# Patient Record
Sex: FEMALE | Race: WHITE | NOT HISPANIC OR LATINO | Employment: OTHER | URBAN - METROPOLITAN AREA
[De-identification: names, ages, dates, MRNs, and addresses within clinical notes are randomized per-mention and may not be internally consistent; named-entity substitution may affect disease eponyms.]

---

## 2017-03-16 ENCOUNTER — PATIENT MESSAGE (OUTPATIENT)
Dept: NEUROLOGY | Facility: CLINIC | Age: 47
End: 2017-03-16

## 2017-03-17 ENCOUNTER — TELEPHONE (OUTPATIENT)
Dept: NEUROLOGY | Facility: CLINIC | Age: 47
End: 2017-03-17

## 2017-03-17 DIAGNOSIS — G43.019 INTRACTABLE MIGRAINE WITHOUT AURA AND WITHOUT STATUS MIGRAINOSUS: Primary | ICD-10-CM

## 2017-03-17 RX ORDER — ELETRIPTAN HYDROBROMIDE 40 MG/1
40 TABLET, FILM COATED ORAL
Qty: 36 TABLET | OUTPATIENT
Start: 2017-03-17 | End: 2017-03-20 | Stop reason: SDUPTHER

## 2017-03-17 RX ORDER — NARATRIPTAN 1 MG/1
TABLET ORAL
Qty: 8 TABLET | Refills: 5 | Status: SHIPPED | OUTPATIENT
Start: 2017-03-17 | End: 2017-05-02 | Stop reason: ALTCHOICE

## 2017-03-17 NOTE — TELEPHONE ENCOUNTER
----- Message from Alanis Coelho MD sent at 3/17/2017  7:55 AM CDT -----  Please fax Relpax Rx Please put ORDER # on it: [id number]  FAX directly to: [phone], ORDER #: [id number]. Please contact patient to inform her both Rx requests have been sent.

## 2017-03-20 ENCOUNTER — TELEPHONE (OUTPATIENT)
Dept: NEUROLOGY | Facility: CLINIC | Age: 47
End: 2017-03-20

## 2017-03-20 DIAGNOSIS — G43.019 INTRACTABLE MIGRAINE WITHOUT AURA AND WITHOUT STATUS MIGRAINOSUS: ICD-10-CM

## 2017-03-20 RX ORDER — ELETRIPTAN HYDROBROMIDE 40 MG/1
40 TABLET, FILM COATED ORAL
Qty: 36 TABLET | Status: SHIPPED | OUTPATIENT
Start: 2017-03-20 | End: 2018-11-12 | Stop reason: SDUPTHER

## 2017-03-20 NOTE — TELEPHONE ENCOUNTER
Faxed signed Rx along with order #. Confirmation of transmission printed. Message sent through portal advising pt of this.

## 2017-03-20 NOTE — TELEPHONE ENCOUNTER
Rx RELPAX,  40 MG tablet for filled, quantity - 36 pills. and include directions (frequency).  Please put ORDER # on it: [id number]  FAX directly to: [phone], ORDER #: [id number]. Thank you!  Please renew my prescription -  for RELPAX,  40 MG tablet, quantity - 36 pills. and include directions (frequency).  Please put ORDER # on it: [id number]  FAX directly to: [phone], ORDER #: [id number]. Thank you!

## 2017-03-28 RX ORDER — ONDANSETRON 4 MG/1
TABLET, ORALLY DISINTEGRATING ORAL
Qty: 30 TABLET | Refills: 0 | Status: SHIPPED | OUTPATIENT
Start: 2017-03-28 | End: 2022-01-25

## 2017-04-19 ENCOUNTER — PATIENT OUTREACH (OUTPATIENT)
Dept: ADMINISTRATIVE | Facility: HOSPITAL | Age: 47
End: 2017-04-19

## 2017-04-19 RX ORDER — SULFAMETHOXAZOLE AND TRIMETHOPRIM 800; 160 MG/1; MG/1
TABLET ORAL
Refills: 12 | COMMUNITY
Start: 2017-02-06

## 2017-05-02 ENCOUNTER — OFFICE VISIT (OUTPATIENT)
Dept: INTERNAL MEDICINE | Facility: CLINIC | Age: 47
End: 2017-05-02
Attending: INTERNAL MEDICINE
Payer: COMMERCIAL

## 2017-05-02 ENCOUNTER — HOSPITAL ENCOUNTER (OUTPATIENT)
Dept: CARDIOLOGY | Facility: OTHER | Age: 47
Discharge: HOME | End: 2017-05-02
Attending: INTERNAL MEDICINE
Payer: COMMERCIAL

## 2017-05-02 VITALS
BODY MASS INDEX: 25.94 KG/M2 | SYSTOLIC BLOOD PRESSURE: 107 MMHG | OXYGEN SATURATION: 97 % | WEIGHT: 146.38 LBS | DIASTOLIC BLOOD PRESSURE: 70 MMHG | HEART RATE: 73 BPM | HEIGHT: 63 IN

## 2017-05-02 DIAGNOSIS — E03.9 HYPOTHYROIDISM, UNSPECIFIED TYPE: ICD-10-CM

## 2017-05-02 DIAGNOSIS — Z00.00 ANNUAL PHYSICAL EXAM: ICD-10-CM

## 2017-05-02 DIAGNOSIS — E55.9 VITAMIN D DEFICIENCY: ICD-10-CM

## 2017-05-02 DIAGNOSIS — Z01.818 PRE-OP EVALUATION: Primary | ICD-10-CM

## 2017-05-02 DIAGNOSIS — Z01.818 PRE-OP EVALUATION: ICD-10-CM

## 2017-05-02 DIAGNOSIS — R73.01 IFG (IMPAIRED FASTING GLUCOSE): ICD-10-CM

## 2017-05-02 PROCEDURE — 99214 OFFICE O/P EST MOD 30 MIN: CPT | Mod: S$GLB,,, | Performed by: INTERNAL MEDICINE

## 2017-05-02 PROCEDURE — 99999 PR PBB SHADOW E&M-EST. PATIENT-LVL III: CPT | Mod: 25,PBBFAC,, | Performed by: INTERNAL MEDICINE

## 2017-05-02 PROCEDURE — 93005 ELECTROCARDIOGRAM TRACING: CPT

## 2017-05-02 PROCEDURE — 93010 ELECTROCARDIOGRAM REPORT: CPT | Mod: ,,, | Performed by: INTERNAL MEDICINE

## 2017-05-02 RX ORDER — MUPIROCIN 20 MG/G
OINTMENT TOPICAL
Refills: 0 | COMMUNITY
Start: 2017-04-25 | End: 2022-01-24

## 2017-05-02 RX ORDER — HYDROCODONE BITARTRATE AND ACETAMINOPHEN 5; 325 MG/1; MG/1
TABLET ORAL
Refills: 0 | COMMUNITY
Start: 2017-04-25 | End: 2022-01-24

## 2017-05-02 RX ORDER — TRIAMCINOLONE ACETONIDE 1 MG/G
CREAM TOPICAL
Refills: 0 | COMMUNITY
Start: 2017-03-30 | End: 2022-01-24

## 2017-05-02 NOTE — MR AVS SNAPSHOT
[facility]  [address]  [place] [address]  Phone: [phone]  Fax: [phone]                  Leela Hanna   2017 10:20 AM   Office Visit    Description:  Female : 1970   Provider:  Fina Fry MD   Department:  [facility]           Reason for Visit     Pre-op Exam           Diagnoses this Visit        Comments    Pre-op evaluation    -  Primary     Hypothyroidism, unspecified type         IFG (impaired fasting glucose)         Vitamin D deficiency         Annual physical exam                To Do List           Future Appointments        Provider Department Dept Phone    2017 11:15 AM CARDIOLOGY, TESTS [facility] [phone]      Goals (5 Years of Data)     None      Follow-Up and Disposition     Return in about 1 year (around 2018), or if symptoms worsen or fail to improve.      [facility]     [facility] Nurse Care Line -  Assistance  Unless otherwise directed by your provider, please contact [facility], our nurse care line that is available for  assistance.     Registered nurses in the [facility] provide: appointment scheduling, clinical advisement, health education, and other advisory services.  Call: [phone] (toll free)               Medications           Message regarding Medications     Verify the changes and/or additions to your medication regime listed below are the same as discussed with your clinician today.  If any of these changes or additions are incorrect, please notify your healthcare provider.        STOP taking these medications     naratriptan (AMERGE) 1 MG Tab Take as needed for breakthrough migraine.  Can repeat once after 2 hours ian 24 hour period           Verify that the below list of medications is an accurate representation of the medications you are currently taking.  If none reported, the list may be blank. If  "incorrect, please contact your healthcare provider. Carry this list with you in case of emergency.           Current Medications     ARMOUR THYROID 60 mg Tab TK  1 T  1/2  HOUR   BEFORE  BREAKFAST    eletriptan (RELPAX) 40 MG tablet Take 1 tablet (40 mg total) by mouth as needed. may repeat in 2 hours if necessary, may repeat in 2 hours if necessary, but no more than two tablets in 24 hours    hydrocodone-acetaminophen 5-325mg (NORCO) 5-325 mg per tablet TK 1 TO 2 TS PO Q 6 H PRN P    mupirocin (BACTROBAN) 2 % ointment LORENA INTRANASAL PREOP AS DIRECTED    ondansetron (ZOFRAN-ODT) 4 MG TbDL PLACE ONE TABLET BY MOUTH UNDER THE TONGUE EVERY 8 HOURS AS NEEDED FOR NAUSEA    PROGESTERONE MISC 200 mg.    triamcinolone acetonide 0.1% (KENALOG) 0.1 % cream LORENA ONE APPLICATION ON THE SKIN BID           Clinical Reference Information           Your Vitals Were     BP Pulse Height Weight SpO2 BMI    107/70 73 5' 3" (1.6 m) 66.4 kg (146 lb 6.2 oz) 97% 25.93 kg/m2      Blood Pressure          Most Recent Value    BP  107/70      Allergies as of 5/2/2017     Imitrex [Sumatriptan]    Latex, Natural Rubber      Immunizations Administered on Date of Encounter - 5/2/2017     None      Orders Placed During Today's Visit     Future Labs/Procedures Expected by Expires    Hemoglobin A1c  5/2/2017 5/2/2018    Hepatic function panel  5/9/2017 5/2/2018    SCHEDULED EKG 12-LEAD (to Muse)  As directed 5/2/2018      Language Assistance Services     ATTENTION: Language assistance services are available, free of charge. Please call [phone].      ATENCIÓN: Si habla christy, tiene a grey disposición servicios gratuitos de asistencia lingüística. Llame al [phone].     CHÚ Ý: N?u b?n nói Ti?ng Vi?t, có các d?ch v? h? tr? ngôn ng? mi?n phí dành cho b?n. G?i s? [phone].         [facility] complies with applicable Federal civil rights laws and does not discriminate on the basis of race, color, national origin, age, " disability, or sex.

## 2017-05-02 NOTE — PROGRESS NOTES
Subjective:       Patient ID: Leela Hanna is a 46 y.o. female.    Chief Complaint: Pre-op Exam    HPI   Pt here for pre op exam and annual exam.   Seen by breast surgeon to remove axillary breast tissue L > R May 15, 2017 - seen by 2 surgeons in [facility] and was told would have large scars and went back to [place] for another opinion. Is expecting better post surgical outcomes from less scarring with planned elective procedure.     Patient has no personal or family history of hypercoagulable disorders, pulmonary embolisms, DVT, or bleeding disorders. No family history of sudden death. No personal or family history of difficulty with anesthesia. Patient is able to climb a flight of stairs without difficulty breathing. No cp. Exercising twice per week - free wts and elliptical and rowing, walking on treadmill. Able to clean home and yard work without issues, decreased stamina, sob or cp.     Will have gen anesthesia.   To have bmp, hgc, cbc, inr, ptt, pt - to have labs sung as ordered by surgeon.   Taking armour thyroid prescribed by another MD - to have thyroid labs monitored as well as vit d managed by that MD  F/u with Dr. Díaz gyn in [place]      Review of Systems    Objective:      Physical Exam   Constitutional: She is oriented to person, place, and time. She appears well-developed and well-nourished.   HENT:   Head: Normocephalic and atraumatic.   Right Ear: External ear normal.   Left Ear: External ear normal.   Nose: Nose normal.   Mouth/Throat: Oropharynx is clear and moist. No oropharyngeal exudate.   No carotid bruits   Eyes: Conjunctivae and EOM are normal.   Neck: Neck supple. No thyromegaly present.   Cardiovascular: Normal rate, regular rhythm, normal heart sounds and intact distal pulses.    Pulmonary/Chest: Effort normal and breath sounds normal.   Abdominal: Soft. Bowel sounds are normal.   Musculoskeletal: She exhibits no edema or tenderness.   Lymphadenopathy:     She has no cervical  "adenopathy.   Neurological: She is alert and oriented to person, place, and time. Coordination normal.   Skin: Skin is warm and dry.   Psychiatric: She has a normal mood and affect. Her behavior is normal. Judgment and thought content normal.       Assessment:       Leela was seen today for pre-op exam.    Diagnoses and all orders for this visit:    Pre-op evaluation: Patient has no personal or family history of hypercoagulable disorders, pulmonary embolisms, DVT, or bleeding disorders. No family history of sudden death. No personal or family history of difficulty with anesthesia. Patient is able to climb a flight of stairs without difficulty breathing.  Able to perform > 4 METS. EKG checked and wnl. Will review labs when received. Pt is low risk for procedure - f/u labs when returns  -     Hepatic function panel; Future  -     SCHEDULED EKG 12-LEAD (to Muse); Future    Hypothyroidism, unspecified type: stable - per pt tx with nanette thyroid by another MD "wholistic MD" who is closely monitoring thyroid labs - she reports is due for labs today or sung and will send those results for review as well. kalli current med - was started on this as her thyroid labs were in normal range but close to cut off     IFG (impaired fasting glucose): cont reg exercise and dietary modification  -     Hemoglobin A1c; Future    Vitamin D deficiency: cont vit d supp- per pt other MD is closely monitoring this as well.     Annual physical exam: review labs as ordered by surgeon and other MD as above  Recommend daily sunscreen, cardiovascular exercise min 30 min 5 days per week. Seatbelts routinely.    HM: mmg ordered by pt's gyn per her - she would like to cont this    ADDENDUM: EKG wnl, labs received from [facility]. Form completed and to be faxed by office to surgeon. Pt to be notified ok to proceed with surgery from my standpoint. Allen CELESTE NSQIP risk 0.0%  Maximally medically managed. She is to f/u with her endocrinologist " outside of [facility] to f/u on recent thyroid labs and mgmt of armour thyroid rx

## 2017-05-08 LAB
ALBUMIN SERPL-MCNC: 4.1 G/DL (ref 3.6–5.1)
ALBUMIN/GLOB SERPL: 1.8 (CALC) (ref 1–2.5)
ALP SERPL-CCNC: 89 U/L (ref 33–115)
ALT SERPL-CCNC: 24 U/L (ref 6–29)
AST SERPL-CCNC: 16 U/L (ref 10–35)
BILIRUB DIRECT SERPL-MCNC: 0 MG/DL
BILIRUB INDIRECT SERPL-MCNC: 0.3 MG/DL (CALC) (ref 0.2–1.2)
BILIRUB SERPL-MCNC: 0.3 MG/DL (ref 0.2–1.2)
GLOBULIN SER CALC-MCNC: 2.3 G/DL (CALC) (ref 1.9–3.7)
HBA1C MFR BLD: 5.2 % OF TOTAL HGB
PROT SERPL-MCNC: 6.4 G/DL (ref 6.1–8.1)

## 2017-05-10 ENCOUNTER — TELEPHONE (OUTPATIENT)
Dept: INTERNAL MEDICINE | Facility: CLINIC | Age: 47
End: 2017-05-10

## 2017-05-10 ENCOUNTER — PATIENT MESSAGE (OUTPATIENT)
Dept: INTERNAL MEDICINE | Facility: CLINIC | Age: 47
End: 2017-05-10

## 2017-05-10 NOTE — TELEPHONE ENCOUNTER
Message sent to pt via my chart with lab results and updates to plan.     I have not received the rest of the pre-op labs ordered by her surgeon to date. Can you inquire where they are drawn get those results or ask the lab to send them over ASA for pre op clearance. Thanks!

## 2017-05-10 NOTE — TELEPHONE ENCOUNTER
Attempted to contact [facility] to inquire of pt's lab results for pre op clearance to the second time. Left a message on [facility] on [facility] to return call to office in regards to the pt's labs.

## 2017-05-10 NOTE — TELEPHONE ENCOUNTER
Attempted to contact [facility] to inquire about why the rest of the labs ordered by surgeon to date have not been received. [facility]'s answering service is currently not accepting calls at this time. Will attempt again at a later time.

## 2017-05-10 NOTE — TELEPHONE ENCOUNTER
Pt reports that [facility] faxed results today while she was waiting on phone - please check faxes and print labs and place on my desk to review. Thanks!

## 2017-05-11 NOTE — TELEPHONE ENCOUNTER
----- Message from Carol Espinal sent at 5/10/2017  4:21 PM CDT -----  Contact: Dr. Márquez   x_  1st Request  _  2nd Request  _  3rd Request        Who: Magdalena     Why: Magdalena call to request the history and physical to be faxed to [phone] on tomorrow the surgery is scheduled for Monday 05/15. If any questions call her at the number below.    What Number to Call Back: [phone]    When to Expect a call back: (Before the end of the day)   -- if the call is after 12:00, the call back will be tomorrow.

## 2017-05-12 ENCOUNTER — TELEPHONE (OUTPATIENT)
Dept: INTERNAL MEDICINE | Facility: CLINIC | Age: 47
End: 2017-05-12

## 2017-05-12 ENCOUNTER — PATIENT MESSAGE (OUTPATIENT)
Dept: INTERNAL MEDICINE | Facility: CLINIC | Age: 47
End: 2017-05-12

## 2017-05-12 NOTE — TELEPHONE ENCOUNTER
----- Message from Alfreda Morales sent at 5/11/2017  8:53 AM CDT -----  Contact: Self  x  1st Request  _  2nd Request  _  3rd Request        Who: EVANGELISTA VAN [[id number]]    Why: Pt states she needs to speak to the clinical team regarding her lab work that [facility] is sending over. Pt states she is having surgery soon. Please call, thanks!    What Number to Call Back: [phone]    When to Expect a call back: (Before the end of the day)   -- if the call is after 12:00, the call back will be tomorrow.

## 2017-05-12 NOTE — TELEPHONE ENCOUNTER
Pt's pre op clearance has been faxed as requested by pcp.Left a message on pt's voicemail informing her of pre op clearance.

## 2017-05-12 NOTE — TELEPHONE ENCOUNTER
Please fax completed pre op clearance form to surgeon today and notify pt that ok to proceed with surgery. Thanks!

## 2018-02-20 ENCOUNTER — TELEPHONE (OUTPATIENT)
Dept: INTERNAL MEDICINE | Facility: CLINIC | Age: 48
End: 2018-02-20

## 2018-02-20 NOTE — TELEPHONE ENCOUNTER
----- Message from Mellisa Nugent sent at 2/20/2018 11:53 AM CST -----  Contact: Self [phone]  Pt is requesting a call back to see if she can get a general idea of the cost to see the provider due to her high insurance deductible plan.  Pt attempted to get an answer from billing and pre-service but they were not able to give her an amount.    Pt was scheduled for 3pm today for a cough but has cancelled it and she wanted to know how the appointment would be coded.    Pt may be reached at [phone].    Thank you.  JAVIER

## 2018-02-20 NOTE — TELEPHONE ENCOUNTER
Spoke with Pt. On the phone in regards to her Co pay. Pt. Stated that she would like to know not what the Co pay was but instead about her high insurance deductible. I then informed Pt. That I could provide her with the registration number because they could be more of an assistance to her in that area. Pt declined because she stated that she had already spoken to 10+ people who had told her the same thing. I informed Pt that she could call her insurance company or I can provide her with the contact information for billing and coding. Pt then began to use profanity towards me and asked for the number to give a complaint. I then gave her Patient relations contact information. Pt then hung up.    -June

## 2018-04-27 ENCOUNTER — TELEPHONE (OUTPATIENT)
Dept: ADMINISTRATIVE | Facility: HOSPITAL | Age: 48
End: 2018-04-27

## 2018-04-27 NOTE — TELEPHONE ENCOUNTER
Consultation notes from the [facility] encounter 4.16.18 received and scanned into patient chart can be reviewed under media tab.

## 2018-11-12 ENCOUNTER — TELEPHONE (OUTPATIENT)
Dept: NEUROLOGY | Facility: CLINIC | Age: 48
End: 2018-11-12

## 2018-11-12 DIAGNOSIS — G43.019 INTRACTABLE MIGRAINE WITHOUT AURA AND WITHOUT STATUS MIGRAINOSUS: ICD-10-CM

## 2018-11-12 RX ORDER — ELETRIPTAN HYDROBROMIDE 40 MG/1
40 TABLET, FILM COATED ORAL
Qty: 36 TABLET | OUTPATIENT
Start: 2018-11-12 | End: 2018-11-13 | Stop reason: SDUPTHER

## 2018-11-12 NOTE — TELEPHONE ENCOUNTER
Leela Hanna would like a refill of the following medications:         eletriptan (RELPAX) 40 MG tablet [Alanis Coelho MD]       Patient Comment: Dr Coelho,  Please refill the same as previous - I am able to order 36 tablets at at time.  Thanks.     Preferred pharmacy: Other - [facility] [place] [phone]   Delivery method: Pickup

## 2018-11-12 NOTE — TELEPHONE ENCOUNTER
Leela Hanna would like a refill of the following medications:         eletriptan (RELPAX) 40 MG tablet [Alanis Coelho MD]       Patient Comment: Dr Coelho,  Please refill the same as previous - I am able to order 36 tablets at at time.  Thanks.     Preferred pharmacy: Other - [facility]X TO [facility] [place] [phone]   Delivery method: Pickup

## 2018-11-13 ENCOUNTER — TELEPHONE (OUTPATIENT)
Dept: NEUROLOGY | Facility: CLINIC | Age: 48
End: 2018-11-13

## 2018-11-13 DIAGNOSIS — G43.019 INTRACTABLE MIGRAINE WITHOUT AURA AND WITHOUT STATUS MIGRAINOSUS: ICD-10-CM

## 2018-11-13 RX ORDER — ELETRIPTAN HYDROBROMIDE 40 MG/1
40 TABLET, FILM COATED ORAL
Qty: 36 TABLET | Status: SHIPPED | OUTPATIENT
Start: 2018-11-13 | End: 2022-01-24

## 2018-11-13 NOTE — TELEPHONE ENCOUNTER
Left message with patient and explained dr. Coelho is currently out of the office and is unable to sign prescriptions and I also let her know that she can call her pcp to see if they can fill it for her

## 2018-11-13 NOTE — TELEPHONE ENCOUNTER
Dr. Carbone,     Patient would like her medication to be fax to [facility]. Can you print the medication out for me so I can fax it?    Thanks,   Brandi

## 2018-11-13 NOTE — TELEPHONE ENCOUNTER
----- Message from Zachary Paiz sent at 11/13/2018  9:47 AM CST -----  Contact: EVANGELISTA VAN [[id number]]    Name of Who is Calling: EVANGELISTA VAN [[id number]]      What is the request in detail: Patient needs the prescription for Relpax FAX TO [facility] [phone].      Can the clinic reply by [facility]: no      [facility]: [phone]

## 2018-12-28 DIAGNOSIS — Z12.11 COLON CANCER SCREENING: ICD-10-CM

## 2021-03-04 ENCOUNTER — PATIENT MESSAGE (OUTPATIENT)
Dept: INTERNAL MEDICINE | Facility: CLINIC | Age: 51
End: 2021-03-04

## 2021-04-26 ENCOUNTER — PATIENT MESSAGE (OUTPATIENT)
Dept: RESEARCH | Facility: HOSPITAL | Age: 51
End: 2021-04-26

## 2022-01-07 ENCOUNTER — PATIENT MESSAGE (OUTPATIENT)
Dept: INTERNAL MEDICINE | Facility: CLINIC | Age: 52
End: 2022-01-07
Payer: COMMERCIAL

## 2022-01-10 NOTE — TELEPHONE ENCOUNTER
1. Needs to schedule appt in person to re-est care as last appt was in 2017  2. Ok to schedule VV if sooner availability to review her labs and address her concerns

## 2022-01-24 ENCOUNTER — PATIENT MESSAGE (OUTPATIENT)
Dept: INTERNAL MEDICINE | Facility: CLINIC | Age: 52
End: 2022-01-24
Payer: COMMERCIAL

## 2022-01-25 ENCOUNTER — OFFICE VISIT (OUTPATIENT)
Dept: INTERNAL MEDICINE | Facility: CLINIC | Age: 52
End: 2022-01-25
Attending: INTERNAL MEDICINE
Payer: COMMERCIAL

## 2022-01-25 ENCOUNTER — PATIENT MESSAGE (OUTPATIENT)
Dept: INTERNAL MEDICINE | Facility: CLINIC | Age: 52
End: 2022-01-25

## 2022-01-25 DIAGNOSIS — E78.5 HYPERLIPIDEMIA, UNSPECIFIED HYPERLIPIDEMIA TYPE: Primary | ICD-10-CM

## 2022-01-25 DIAGNOSIS — E55.9 VITAMIN D DEFICIENCY: ICD-10-CM

## 2022-01-25 DIAGNOSIS — E04.1 THYROID NODULE: ICD-10-CM

## 2022-01-25 DIAGNOSIS — G43.909 MIGRAINE WITHOUT STATUS MIGRAINOSUS, NOT INTRACTABLE, UNSPECIFIED MIGRAINE TYPE: ICD-10-CM

## 2022-01-25 DIAGNOSIS — E53.8 VITAMIN B 12 DEFICIENCY: ICD-10-CM

## 2022-01-25 PROCEDURE — 1159F PR MEDICATION LIST DOCUMENTED IN MEDICAL RECORD: ICD-10-PCS | Mod: CPTII,95,, | Performed by: INTERNAL MEDICINE

## 2022-01-25 PROCEDURE — 99204 OFFICE O/P NEW MOD 45 MIN: CPT | Mod: 95,,, | Performed by: INTERNAL MEDICINE

## 2022-01-25 PROCEDURE — 1159F MED LIST DOCD IN RCRD: CPT | Mod: CPTII,95,, | Performed by: INTERNAL MEDICINE

## 2022-01-25 PROCEDURE — 1160F PR REVIEW ALL MEDS BY PRESCRIBER/CLIN PHARMACIST DOCUMENTED: ICD-10-PCS | Mod: CPTII,95,, | Performed by: INTERNAL MEDICINE

## 2022-01-25 PROCEDURE — 1160F RVW MEDS BY RX/DR IN RCRD: CPT | Mod: CPTII,95,, | Performed by: INTERNAL MEDICINE

## 2022-01-25 PROCEDURE — 99204 PR OFFICE/OUTPT VISIT, NEW, LEVL IV, 45-59 MIN: ICD-10-PCS | Mod: 95,,, | Performed by: INTERNAL MEDICINE

## 2022-01-25 RX ORDER — ESCITALOPRAM OXALATE 10 MG/1
10 TABLET ORAL DAILY
COMMUNITY
Start: 2021-12-22

## 2022-01-25 RX ORDER — GALCANEZUMAB 120 MG/ML
INJECTION, SOLUTION SUBCUTANEOUS
COMMUNITY
Start: 2021-01-01

## 2022-01-25 NOTE — PROGRESS NOTES
Subjective:   Patient ID: Leela Hanna is a 51 y.o. female  Chief complaint:   Chief Complaint   Patient presents with    Abnormal Lab     F/u       HPI  The patient location is: [place]  The chief complaint leading to consultation is: lab follow up     Visit type: audiovisual    Face to Face time with patient: 45 min  50 minutes of total time spent on the encounter, which includes face to face time and non-face to face time preparing to see the patient (eg, review of tests), Obtaining and/or reviewing separately obtained history, Documenting clinical information in the electronic or other health record, Independently interpreting results (not separately reported) and communicating results to the patient/family/caregiver, or Care coordination (not separately reported).     Each patient to whom he or she provides medical services by telemedicine is:  (1) informed of the relationship between the physician and patient and the respective role of any other health care provider with respect to management of the patient; and (2) notified that he or she may decline to receive medical services by telemedicine and may withdraw from such care at any time.    Notes:     Here for follow up and lab review - [facility] 5/2017  - seen by wellness doctor: Dr. Carol Rowell - helping to manage armour thyroid   Dr. Perez - derm   - has been followed by her gyn for HRT   - followed by neuro for migraines    She is currently taking:   Escitalopram - 10 milligrams - - through my neurologist  Baclofen - 10 mg (assist in tension headache prevention - through my neurologist  Emgality - monthly injection (migraine prevention - - through my neurologist)  Thyroid [place] - 1 gram  Hydrochlorothazide - 12.5 mg - water retention   Progesterone - 200 mg   Testosterone - ? (don't have it in front of me)  Biest - 5 mg  Vitamin D supplement - with K1, 2 - [employer]  A multi-vitamin    Over past year exercised minimally since  pandemic   Gained weight   Tried keto - eating more beef and cheese   - LDL inc significantly   Using online health  recently - reducing meat significantly and inc vegetables and legumes  - currently Intermittent fasting   - if consumes fats will eat small amt of avocado and flax seed, manuela, vegetables, whole grains, lentils  - increasing exercise recently   - weight 160#     HLD:   LDL inc 195 <- 138  She had carotid US and per her it showed atherosclerosis     Hypothyroidism:   Followed by gyn as above   kalli fitzpatrick     Dad passed away about 2 years ago - cause unknown - home alone and he was found the next morning- hit head after fall - occurred in [place]      Reviewed labs available   She reports carotid US showed thyroid nodule     Review of Systems    Objective:  Vitals:    01/25/22 0649   BP: Comment: doesn't check bp at home     There is no height or weight on file to calculate BMI.    Physical Exam  Constitutional:       General: She is not in acute distress.     Appearance: She is well-developed and well-nourished. She is not diaphoretic.   Eyes:      General:         Right eye: No discharge.         Left eye: No discharge.      Extraocular Movements: EOM normal.      Conjunctiva/sclera: Conjunctivae normal.   Pulmonary:      Effort: Pulmonary effort is normal. No respiratory distress.   Skin:     Findings: No erythema or rash.   Neurological:      Mental Status: She is alert and oriented to person, place, and time.   Psychiatric:         Mood and Affect: Mood and affect normal.         Behavior: Behavior normal.         Thought Content: Thought content normal.         Judgment: Judgment normal.       Assessment:  1. Hyperlipidemia, unspecified hyperlipidemia type    2. Thyroid nodule    3. Migraine without status migrainosus, not intractable, unspecified migraine type    4. Vitamin D deficiency    5. Vitamin B 12 deficiency        Plan:  Leela was seen today for abnormal lab.    Diagnoses and all  orders for this visit:    Hyperlipidemia, unspecified hyperlipidemia type  I recommend the Mediterranean diet, a graded exercise program and maintaining a healthy weight to optimize cholesterol levels.  Discussed med options and she    She will let me know about starting statin - discussed options  Cont graded exercise program     Thyroid nodule  -     US Soft Tissue Head Neck Thyroid; Future  tsh at goal on recent labs - taking armour   Check US to better characterize nodule seen on carotid US     Migraine without status migrainosus, not intractable, unspecified migraine type  Stable followed by neuro   Cont meds     Vitamin D deficiency  Cont suppl   Level improved     Vitamin B 12 deficiency  Stable - cont suppl     she will send us location where thyroid US order should be sent - nodule seen on carotid US from outside facility   All questions were answered and pt verbalized understanding of plan.     rtc in 3 months for in person visit as she is concerned about exposure to Covid during recent surge     Health Maintenance   Topic Date Due    Hepatitis C Screening  Never done    TETANUS VACCINE  12/01/2019    Mammogram  03/12/2022    Lipid Panel  12/31/2025

## 2022-01-28 ENCOUNTER — PATIENT MESSAGE (OUTPATIENT)
Dept: INTERNAL MEDICINE | Facility: CLINIC | Age: 52
End: 2022-01-28
Payer: COMMERCIAL

## 2022-01-31 NOTE — TELEPHONE ENCOUNTER
1. I recommend a statin due to the presence of cholesterol plaque seen on the ultrasound report and the elevated LDL levels - however if she would like to wait and review the results with the ordering physician first then it is ok to wait for that appointment prior to starting any medication to ensure all of her questions are addressed.     2. It is ok to hold off on a cardiology consult for now and instead follow up with the ordering physician first

## 2022-02-01 ENCOUNTER — PATIENT MESSAGE (OUTPATIENT)
Dept: INTERNAL MEDICINE | Facility: CLINIC | Age: 52
End: 2022-02-01
Payer: COMMERCIAL

## 2022-02-01 NOTE — TELEPHONE ENCOUNTER
This was billed as a new patient visit (a patient is considered a new patient if not seen within 3 years in primary care - last clinic visit was in 2017) - for a 45min visit which was the length of the visit   Please offer her the number to our billing department if she would like to investigate this further

## 2022-02-03 NOTE — TELEPHONE ENCOUNTER
[id number] - this was not an annual or preventative/wellness visit as those must be conducted in person in order to be billed as such

## 2022-03-11 ENCOUNTER — PATIENT MESSAGE (OUTPATIENT)
Dept: INTERNAL MEDICINE | Facility: CLINIC | Age: 52
End: 2022-03-11
Payer: COMMERCIAL

## 2022-03-11 DIAGNOSIS — R92.30 BREAST DENSITY: ICD-10-CM

## 2022-03-11 DIAGNOSIS — Z12.31 ENCOUNTER FOR SCREENING MAMMOGRAM FOR BREAST CANCER: Primary | ICD-10-CM

## 2022-03-16 DIAGNOSIS — Z12.11 COLON CANCER SCREENING: ICD-10-CM

## 2022-03-21 ENCOUNTER — PATIENT MESSAGE (OUTPATIENT)
Dept: ADMINISTRATIVE | Facility: HOSPITAL | Age: 52
End: 2022-03-21
Payer: COMMERCIAL

## 2022-05-30 ENCOUNTER — PATIENT MESSAGE (OUTPATIENT)
Dept: ADMINISTRATIVE | Facility: HOSPITAL | Age: 52
End: 2022-05-30
Payer: COMMERCIAL

## 2023-04-12 DIAGNOSIS — Z12.31 OTHER SCREENING MAMMOGRAM: ICD-10-CM

## 2023-10-24 ENCOUNTER — PATIENT MESSAGE (OUTPATIENT)
Dept: INTERNAL MEDICINE | Facility: CLINIC | Age: 53
End: 2023-10-24
Payer: COMMERCIAL

## 2023-10-24 DIAGNOSIS — Z12.31 ENCOUNTER FOR SCREENING MAMMOGRAM FOR BREAST CANCER: Primary | ICD-10-CM

## 2023-10-24 DIAGNOSIS — R92.30 BREAST DENSITY: ICD-10-CM

## 2023-12-13 ENCOUNTER — OFFICE VISIT (OUTPATIENT)
Dept: INTERNAL MEDICINE | Facility: CLINIC | Age: 53
End: 2023-12-13
Attending: INTERNAL MEDICINE
Payer: COMMERCIAL

## 2023-12-13 VITALS
WEIGHT: 157.88 LBS | HEIGHT: 64 IN | SYSTOLIC BLOOD PRESSURE: 104 MMHG | OXYGEN SATURATION: 99 % | HEART RATE: 81 BPM | DIASTOLIC BLOOD PRESSURE: 68 MMHG | BODY MASS INDEX: 26.95 KG/M2

## 2023-12-13 DIAGNOSIS — E03.9 HYPOTHYROIDISM, UNSPECIFIED TYPE: ICD-10-CM

## 2023-12-13 DIAGNOSIS — E53.8 VITAMIN B 12 DEFICIENCY: ICD-10-CM

## 2023-12-13 DIAGNOSIS — Z91.89 AT HIGH RISK FOR BREAST CANCER: ICD-10-CM

## 2023-12-13 DIAGNOSIS — E55.9 VITAMIN D DEFICIENCY: ICD-10-CM

## 2023-12-13 DIAGNOSIS — Z12.11 SCREEN FOR COLON CANCER: ICD-10-CM

## 2023-12-13 DIAGNOSIS — E53.8 B12 DEFICIENCY: ICD-10-CM

## 2023-12-13 DIAGNOSIS — E78.5 HYPERLIPIDEMIA, UNSPECIFIED HYPERLIPIDEMIA TYPE: ICD-10-CM

## 2023-12-13 DIAGNOSIS — Z00.00 ANNUAL PHYSICAL EXAM: Primary | ICD-10-CM

## 2023-12-13 DIAGNOSIS — E04.1 THYROID NODULE: ICD-10-CM

## 2023-12-13 DIAGNOSIS — G43.009 MIGRAINE WITHOUT AURA AND WITHOUT STATUS MIGRAINOSUS, NOT INTRACTABLE: ICD-10-CM

## 2023-12-13 PROCEDURE — 3008F PR BODY MASS INDEX (BMI) DOCUMENTED: ICD-10-PCS | Mod: CPTII,S$GLB,, | Performed by: INTERNAL MEDICINE

## 2023-12-13 PROCEDURE — 3074F SYST BP LT 130 MM HG: CPT | Mod: CPTII,S$GLB,, | Performed by: INTERNAL MEDICINE

## 2023-12-13 PROCEDURE — 1159F MED LIST DOCD IN RCRD: CPT | Mod: CPTII,S$GLB,, | Performed by: INTERNAL MEDICINE

## 2023-12-13 PROCEDURE — 3074F PR MOST RECENT SYSTOLIC BLOOD PRESSURE < 130 MM HG: ICD-10-PCS | Mod: CPTII,S$GLB,, | Performed by: INTERNAL MEDICINE

## 2023-12-13 PROCEDURE — 3078F PR MOST RECENT DIASTOLIC BLOOD PRESSURE < 80 MM HG: ICD-10-PCS | Mod: CPTII,S$GLB,, | Performed by: INTERNAL MEDICINE

## 2023-12-13 PROCEDURE — 1160F RVW MEDS BY RX/DR IN RCRD: CPT | Mod: CPTII,S$GLB,, | Performed by: INTERNAL MEDICINE

## 2023-12-13 PROCEDURE — 99999 PR PBB SHADOW E&M-EST. PATIENT-LVL IV: CPT | Mod: PBBFAC,,, | Performed by: INTERNAL MEDICINE

## 2023-12-13 PROCEDURE — 1159F PR MEDICATION LIST DOCUMENTED IN MEDICAL RECORD: ICD-10-PCS | Mod: CPTII,S$GLB,, | Performed by: INTERNAL MEDICINE

## 2023-12-13 PROCEDURE — 99396 PR PREVENTIVE VISIT,EST,40-64: ICD-10-PCS | Mod: S$GLB,,, | Performed by: INTERNAL MEDICINE

## 2023-12-13 PROCEDURE — 99396 PREV VISIT EST AGE 40-64: CPT | Mod: S$GLB,,, | Performed by: INTERNAL MEDICINE

## 2023-12-13 PROCEDURE — 3078F DIAST BP <80 MM HG: CPT | Mod: CPTII,S$GLB,, | Performed by: INTERNAL MEDICINE

## 2023-12-13 PROCEDURE — 1160F PR REVIEW ALL MEDS BY PRESCRIBER/CLIN PHARMACIST DOCUMENTED: ICD-10-PCS | Mod: CPTII,S$GLB,, | Performed by: INTERNAL MEDICINE

## 2023-12-13 PROCEDURE — 3008F BODY MASS INDEX DOCD: CPT | Mod: CPTII,S$GLB,, | Performed by: INTERNAL MEDICINE

## 2023-12-13 PROCEDURE — 99999 PR PBB SHADOW E&M-EST. PATIENT-LVL IV: ICD-10-PCS | Mod: PBBFAC,,, | Performed by: INTERNAL MEDICINE

## 2023-12-13 RX ORDER — ROSUVASTATIN CALCIUM 5 MG/1
5 TABLET, COATED ORAL DAILY
Qty: 90 TABLET | Refills: 3
Start: 2023-12-13 | End: 2024-01-16 | Stop reason: SDUPTHER
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
## 2023-12-14 ENCOUNTER — CLINICAL SUPPORT (OUTPATIENT)
Dept: ENDOSCOPY | Facility: HOSPITAL | Age: 53
End: 2023-12-14
Attending: INTERNAL MEDICINE
Payer: COMMERCIAL

## 2023-12-14 ENCOUNTER — TELEPHONE (OUTPATIENT)
Dept: ENDOSCOPY | Facility: HOSPITAL | Age: 53
End: 2023-12-14

## 2023-12-14 DIAGNOSIS — Z12.11 SCREEN FOR COLON CANCER: ICD-10-CM

## 2023-12-14 NOTE — PLAN OF CARE
Contacted patient to schedule a Colonoscopy and unable to schedule due to no availability at this time. Patient requested a call back. New PAT appt made and Dept number provided to pt [phone].

## 2023-12-14 NOTE — TELEPHONE ENCOUNTER
Contacted patient to schedule a Colonoscopy and unable to schedule due to no availability at this time. Patient requested a call back. New PAT appt made and Dept number provided to pt [phone].

## 2023-12-18 DIAGNOSIS — Z91.89 AT HIGH RISK FOR BREAST CANCER: ICD-10-CM

## 2023-12-18 DIAGNOSIS — Z12.31 ENCOUNTER FOR SCREENING MAMMOGRAM FOR MALIGNANT NEOPLASM OF BREAST: Primary | ICD-10-CM

## 2023-12-19 ENCOUNTER — TELEPHONE (OUTPATIENT)
Dept: INTERNAL MEDICINE | Facility: CLINIC | Age: 53
End: 2023-12-19
Payer: COMMERCIAL

## 2023-12-19 NOTE — TELEPHONE ENCOUNTER
----- Message from Fina Fry MD sent at 12/18/2023  2:17 PM CST -----  Message sent to pt via my chart with results and updates to plan.     Please arrange breast mri in 6 months - please help schedule  Screening mammogram due in 12 months

## 2023-12-26 PROBLEM — E78.5 HYPERLIPIDEMIA: Status: ACTIVE | Noted: 2023-12-26

## 2023-12-26 PROBLEM — Z91.89 AT HIGH RISK FOR BREAST CANCER: Status: ACTIVE | Noted: 2023-12-26

## 2023-12-27 ENCOUNTER — TELEPHONE (OUTPATIENT)
Dept: ENDOSCOPY | Facility: HOSPITAL | Age: 53
End: 2023-12-27

## 2023-12-27 ENCOUNTER — CLINICAL SUPPORT (OUTPATIENT)
Dept: ENDOSCOPY | Facility: HOSPITAL | Age: 53
End: 2023-12-27
Attending: INTERNAL MEDICINE
Payer: COMMERCIAL

## 2023-12-27 DIAGNOSIS — Z12.11 SCREEN FOR COLON CANCER: ICD-10-CM

## 2023-12-27 RX ORDER — SODIUM, POTASSIUM,MAG SULFATES 17.5-3.13G
1 SOLUTION, RECONSTITUTED, ORAL ORAL DAILY
Qty: 1 KIT | Refills: 0 | Status: SHIPPED | OUTPATIENT
Start: 2023-12-27 | End: 2023-12-29

## 2024-01-15 ENCOUNTER — PATIENT MESSAGE (OUTPATIENT)
Dept: INTERNAL MEDICINE | Facility: CLINIC | Age: 54
End: 2024-01-15
Payer: COMMERCIAL

## 2024-01-15 DIAGNOSIS — E78.5 HYPERLIPIDEMIA, UNSPECIFIED HYPERLIPIDEMIA TYPE: ICD-10-CM

## 2024-01-16 RX ORDER — ROSUVASTATIN CALCIUM 5 MG/1
5 TABLET, COATED ORAL DAILY
Qty: 90 TABLET | Refills: 3 | Status: SHIPPED | OUTPATIENT
Start: 2024-01-16

## 2024-01-16 NOTE — TELEPHONE ENCOUNTER
Care Due:                  Date            Visit Type   Department     Provider  --------------------------------------------------------------------------------                                MYCHART                              ANNUAL                              CHECKUP/PHY  [facility] INTERNAL  Last Visit: 12-      [facility]       Fina Fry  Next Visit: None Scheduled  None         None Found                                                            Last  Test          Frequency    Reason                     Performed    Due Date  --------------------------------------------------------------------------------    CMP.........  12 months..  rosuvastatin.............  Not Found    Overdue    Lipid Panel.  12 months..  rosuvastatin.............  Not Found    Overdue    [employer] Embedded Care Due Messages. Reference number: [id number].   1/16/2024 7:10:17 AM CST

## 2024-03-13 ENCOUNTER — PATIENT MESSAGE (OUTPATIENT)
Dept: INTERNAL MEDICINE | Facility: CLINIC | Age: 54
End: 2024-03-13
Payer: COMMERCIAL

## 2024-03-13 NOTE — TELEPHONE ENCOUNTER
I am not sure what is included in a cardio IQ panel - she should check with her ordered provider or quest as it may included the fasting lipid panel (total cholesterol, triglycerides, HDL and LDL levels).   - only other overlap is CMP, CBC and B12, and vitamin D3

## 2024-03-27 ENCOUNTER — TELEPHONE (OUTPATIENT)
Dept: PHARMACY | Facility: CLINIC | Age: 54
End: 2024-03-27
Payer: COMMERCIAL

## 2024-04-09 ENCOUNTER — TELEPHONE (OUTPATIENT)
Dept: ENDOSCOPY | Facility: HOSPITAL | Age: 54
End: 2024-04-09
Payer: COMMERCIAL

## 2024-04-11 ENCOUNTER — TELEPHONE (OUTPATIENT)
Dept: ENDOSCOPY | Facility: HOSPITAL | Age: 54
End: 2024-04-11
Payer: COMMERCIAL

## 2024-04-11 NOTE — TELEPHONE ENCOUNTER
Returned patient call that was left on voicemail no answer left voicemail asking patient to call us back @ [phone]  
normal...

## 2024-04-12 ENCOUNTER — TELEPHONE (OUTPATIENT)
Dept: ENDOSCOPY | Facility: HOSPITAL | Age: 54
End: 2024-04-12
Payer: COMMERCIAL

## 2024-04-12 NOTE — TELEPHONE ENCOUNTER
Spoke to pt to reschedule procedure(s) Colonoscopy       Physician to perform procedure(s) Dr. KANNAN Westfall  Date of Procedure (s) 7/8/24  Arrival Time 9:30 AM  Time of Procedure(s) 10:30 AM   Location of Procedure(s) [place] 4th Floor  Type of Rx Prep sent to patient: Suprep  Instructions provided to patient via [facility]    Patient was informed on the following information and verbalized understanding. Screening questionnaire reviewed with patient and complete. If procedure requires anesthesia, a responsible adult needs to be present to accompany the patient home, patient cannot drive after receiving anesthesia. Appointment details are tentative, especially check-in time. Patient will receive a prep-op call 7 days prior to confirm check-in time for procedure. If applicable the patient should contact their pharmacy to verify Rx for procedure prep is ready for pick-up. Patient was advised to call the scheduling department at [phone] if pharmacy states no Rx is available. Patient was advised to call the endoscopy scheduling department if any questions or concerns arise.      [facility]oscopy Scheduling Department

## 2024-06-27 ENCOUNTER — TELEPHONE (OUTPATIENT)
Dept: ENDOSCOPY | Facility: HOSPITAL | Age: 54
End: 2024-06-27
Payer: COMMERCIAL

## 2024-06-27 ENCOUNTER — PATIENT MESSAGE (OUTPATIENT)
Dept: ENDOSCOPY | Facility: HOSPITAL | Age: 54
End: 2024-06-27
Payer: COMMERCIAL

## 2024-07-01 ENCOUNTER — TELEPHONE (OUTPATIENT)
Dept: ENDOSCOPY | Facility: HOSPITAL | Age: 54
End: 2024-07-01
Payer: COMMERCIAL

## 2024-07-01 NOTE — TELEPHONE ENCOUNTER
Spoke to pt to reschedule procedure(s) Colonoscopy       Physician to perform procedure(s) Dr. KANNAN Westfall  Date of Procedure (s) 9/23/24  Arrival Time 8:00 AM  Time of Procedure(s) 9:00 AM   Location of Procedure(s) [place] 4th Floor  Type of Rx Prep sent to patient: Suprep  Instructions provided to patient via [facility]    Patient was informed on the following information and verbalized understanding. Screening questionnaire reviewed with patient and complete. If procedure requires anesthesia, a responsible adult needs to be present to accompany the patient home, patient cannot drive after receiving anesthesia. Appointment details are tentative, especially check-in time. Patient will receive a prep-op call 7 days prior to confirm check-in time for procedure. If applicable the patient should contact their pharmacy to verify Rx for procedure prep is ready for pick-up. Patient was advised to call the scheduling department at [phone] if pharmacy states no Rx is available. Patient was advised to call the endoscopy scheduling department if any questions or concerns arise.      [facility] Endoscopy Scheduling Department

## 2024-07-19 ENCOUNTER — PATIENT OUTREACH (OUTPATIENT)
Dept: ADMINISTRATIVE | Facility: HOSPITAL | Age: 54
End: 2024-07-19
Payer: COMMERCIAL

## 2024-07-19 NOTE — PROGRESS NOTES
Health Maintenance Due   Topic Date Due    Colorectal Cancer Screening  Never done    Shingles Vaccine (1 of 2) Never done    TETANUS VACCINE  12/01/2019    COVID-19 Vaccine (6 - 2023-24 season) 09/01/2023     Health Maintenance reviewed, updated and links triggered. Annual Exam due portal message sent   For scheduling. ([place]) 7/19/24

## 2024-09-18 ENCOUNTER — PATIENT MESSAGE (OUTPATIENT)
Dept: ENDOSCOPY | Facility: HOSPITAL | Age: 54
End: 2024-09-18
Payer: COMMERCIAL

## 2024-09-18 ENCOUNTER — TELEPHONE (OUTPATIENT)
Dept: ENDOSCOPY | Facility: HOSPITAL | Age: 54
End: 2024-09-18
Payer: COMMERCIAL

## 2024-09-18 NOTE — TELEPHONE ENCOUNTER
Contacted Pt for Endoscopy precall to confirm scheduled procedure(s) Colonoscopy on 9/23/24. Pt did not answer. Left voicemail for pt to call Endoscopy Scheduling to confirm.

## 2024-09-19 ENCOUNTER — TELEPHONE (OUTPATIENT)
Dept: ENDOSCOPY | Facility: HOSPITAL | Age: 54
End: 2024-09-19
Payer: COMMERCIAL

## 2024-09-19 NOTE — TELEPHONE ENCOUNTER
Spoke to pt for pre-call to confirm scheduled Colonoscopy and patient verbalized understanding of the following:       Date of Procedure (s)  verified 9/23/24  Arrival Time 9:15 AM verified.  Location of Procedure(s) [place] 4th Floor verified.  NPO status reinforced. Ok to continue clear liquids up until 4 hours prior to the Endoscopy procedure.   Confirmed Pt is currently taking Mounjaro (Tirzepatide), Pt instructed to stop stop taking Mounjaro (Tirzepatide) on 9/13.24.     Pt confirmed receipt of prep instructions and Rx prep (if applicable).  Instructions provided to patient via [facility]  Pt confirmed ride home after procedure if procedure requires anesthesia.   Pre-call screening questionnaire reviewed and completed with patient.   Appointment details are tentative, including check-in time.  If the patient begins taking any blood thinning medications, injectable weight loss/diabetes medications (other than insulin), or Adipex (phentermine) patient was instructed to contact the endoscopy scheduling department as soon as possible.  Patient was advised to call the endoscopy scheduling department if any questions or concerns arise.       [facility] Endoscopy Scheduling Department

## 2024-09-22 ENCOUNTER — NURSE TRIAGE (OUTPATIENT)
Dept: ADMINISTRATIVE | Facility: CLINIC | Age: 54
End: 2024-09-22
Payer: COMMERCIAL

## 2024-09-23 ENCOUNTER — HOSPITAL ENCOUNTER (OUTPATIENT)
Facility: HOSPITAL | Age: 54
Discharge: HOME | End: 2024-09-23
Attending: COLON & RECTAL SURGERY | Admitting: COLON & RECTAL SURGERY
Payer: COMMERCIAL

## 2024-09-23 ENCOUNTER — ANESTHESIA (OUTPATIENT)
Dept: ENDOSCOPY | Facility: HOSPITAL | Age: 54
End: 2024-09-23
Payer: COMMERCIAL

## 2024-09-23 ENCOUNTER — ANESTHESIA EVENT (OUTPATIENT)
Dept: ENDOSCOPY | Facility: HOSPITAL | Age: 54
End: 2024-09-23
Payer: COMMERCIAL

## 2024-09-23 VITALS
RESPIRATION RATE: 17 BRPM | HEIGHT: 63 IN | DIASTOLIC BLOOD PRESSURE: 68 MMHG | BODY MASS INDEX: 28.35 KG/M2 | SYSTOLIC BLOOD PRESSURE: 114 MMHG | OXYGEN SATURATION: 100 % | TEMPERATURE: 98 F | WEIGHT: 160 LBS | HEART RATE: 73 BPM

## 2024-09-23 DIAGNOSIS — Z13.9 SCREENING DUE: ICD-10-CM

## 2024-09-23 PROCEDURE — E9220 PRA ENDO ANESTHESIA: HCPCS | Mod: ,,, | Performed by: NURSE ANESTHETIST, CERTIFIED REGISTERED

## 2024-09-23 PROCEDURE — 27201089 HC SNARE, DISP (ANY): Performed by: COLON & RECTAL SURGERY

## 2024-09-23 PROCEDURE — 63600175 PHARM REV CODE 636 W HCPCS: Performed by: NURSE ANESTHETIST, CERTIFIED REGISTERED

## 2024-09-23 PROCEDURE — 37000008 HC ANESTHESIA 1ST 15 MINUTES: Performed by: COLON & RECTAL SURGERY

## 2024-09-23 PROCEDURE — 25000003 PHARM REV CODE 250: Performed by: NURSE ANESTHETIST, CERTIFIED REGISTERED

## 2024-09-23 PROCEDURE — 45385 COLONOSCOPY W/LESION REMOVAL: CPT | Mod: 33 | Performed by: COLON & RECTAL SURGERY

## 2024-09-23 PROCEDURE — 37000009 HC ANESTHESIA EA ADD 15 MINS: Performed by: COLON & RECTAL SURGERY

## 2024-09-23 PROCEDURE — 45385 COLONOSCOPY W/LESION REMOVAL: CPT | Mod: 33,,, | Performed by: COLON & RECTAL SURGERY

## 2024-09-23 PROCEDURE — 88305 TISSUE EXAM BY PATHOLOGIST: CPT | Mod: 59 | Performed by: PATHOLOGY

## 2024-09-23 RX ORDER — SODIUM CHLORIDE 9 MG/ML
INJECTION, SOLUTION INTRAVENOUS CONTINUOUS
Status: DISCONTINUED | OUTPATIENT
Start: 2024-09-23 | End: 2024-09-23 | Stop reason: HOSPADM

## 2024-09-23 RX ORDER — PROPOFOL 10 MG/ML
VIAL (ML) INTRAVENOUS
Status: DISCONTINUED | OUTPATIENT
Start: 2024-09-23 | End: 2024-09-23

## 2024-09-23 RX ORDER — LIDOCAINE HYDROCHLORIDE 20 MG/ML
INJECTION INTRAVENOUS
Status: DISCONTINUED | OUTPATIENT
Start: 2024-09-23 | End: 2024-09-23

## 2024-09-23 RX ORDER — ONDANSETRON HYDROCHLORIDE 2 MG/ML
INJECTION, SOLUTION INTRAVENOUS
Status: DISCONTINUED | OUTPATIENT
Start: 2024-09-23 | End: 2024-09-23

## 2024-09-23 RX ADMIN — SODIUM CHLORIDE: 0.9 INJECTION, SOLUTION INTRAVENOUS at 10:09

## 2024-09-23 RX ADMIN — LIDOCAINE HYDROCHLORIDE 50 MG: 20 INJECTION INTRAVENOUS at 10:09

## 2024-09-23 RX ADMIN — ONDANSETRON 4 MG: 2 INJECTION INTRAMUSCULAR; INTRAVENOUS at 10:09

## 2024-09-23 RX ADMIN — PROPOFOL 100 MG: 10 INJECTION, EMULSION INTRAVENOUS at 10:09

## 2024-09-23 NOTE — ANESTHESIA POSTPROCEDURE EVALUATION
Anesthesia Post Evaluation    Patient: Leela Hanna    Procedure(s) Performed: Procedure(s) (LRB):  COLONOSCOPY (N/A)    Final Anesthesia Type: general      Patient location during evaluation: PACU  Patient participation: Yes- Able to Participate  Level of consciousness: awake and alert and oriented  Post-procedure vital signs: reviewed and stable  Pain management: adequate  Airway patency: patent    PONV status at discharge: No PONV  Anesthetic complications: no      Cardiovascular status: blood pressure returned to baseline  Respiratory status: unassisted, room air and spontaneous ventilation  Hydration status: euvolemic  Follow-up not needed.              Vitals Value Taken Time   /60 09/23/24 1122   Temp 36.6 °C (97.9 °F) 09/23/24 1107   Pulse 98 09/23/24 1122   Resp 17 09/23/24 1122   SpO2 98 % 09/23/24 1122         No case tracking events are documented in the log.      Pain/Hallie Score: Hallie Score: 10 (9/23/2024 11:08 AM)

## 2024-09-23 NOTE — H&P
COLONOSCOPY HISTORY AND PHYSICAL EXAM    Procedure : Colonoscopy    INDICATIONS: asymptomatic screening exam    Family Hx of CRC: denies    Last Colonoscopy:  never, first scope     Past Medical History:   Diagnosis Date    Abnormal Pap smear of cervix     Anemia     NICKI    IFG (impaired fasting glucose)     Migraine headache     12/2017, 3/12/18 & 8/13/18outside neuro records recieved and reviewed - Dr. Gutierrez at [facility] - MRI ordered    Orthostatic hypotension      Sedation Problems: NO  Family History   Problem Relation Name Age of Onset    Breast cancer Mother Loretta Montoya 53    Arthritis Mother Loretta Montoya     Migraines Mother Loretta Montoya     Cancer Mother Loretta Montoya     Cancer Father Elio Hanna         skin cancer - type unknown    Arthritis Father Elio Hanna     Migraines Father Elio Hanna     Rashes / Skin problems Sister 1     Migraines Sister 1     MIKE disease Sister 1     Breast cancer Sister 1 42    No Known Problems Brother 1     Breast cancer Maternal Aunt  50    Breast cancer Maternal Aunt  50    Colon cancer Neg Hx      Ovarian cancer Neg Hx       Fam Hx of Sedation Problems: NO  Social History     Socioeconomic History    Marital status: Single   Occupational History    Occupation: life and business    Tobacco Use    Smoking status: Never    Smokeless tobacco: Never   Substance and Sexual Activity    Alcohol use: Yes     Comment: very rarely    Drug use: No    Sexual activity: Not Currently     Birth control/protection: None   Social History Narrative    Moved to [facility] 2012 from [place]     Exercising weekly     Social Determinants of Health     Financial Resource Strain: Low Risk  (5/18/2022)    Received [facility] [employer] [facility]    Overall Financial Resource Strain (CARDIA)     Difficulty of Paying Living Expenses: Not hard at all   Food Insecurity: No Food Insecurity (5/18/2022)    Received from [facility] [employer], [facility] [employer]    Hunger  "Vital Sign     Worried About Running Out of Food in the Last Year: Never true     Ran Out of Food in the Last Year: Never true   Transportation Needs: No Transportation Needs (5/18/2022)    Received from [employer] [facility]    PRAPARE - Transportation     Lack of Transportation (Medical): No     Lack of Transportation (Non-Medical): No   Physical Activity: Insufficiently Active (5/18/2022)    Received from [facility] [employer] [facility]    Exercise Vital Sign     Days of Exercise per Week: 2 days     Minutes of Exercise per Session: 30 min   Stress: No Stress Concern Present (5/18/2022)    Received from [employer] [facility]    [employer] - Occupational Stress Questionnaire     Feeling of Stress : Not at all   Housing Stability: Low Risk  (5/18/2022)    Received from [employer] [facility]    Housing Stability Vital Sign     Unable to Pay for Housing in the Last Year: No     Number of Places Lived in the Last Year: 1     Unstable Housing in the Last Year: No       Review of Systems - Negative except   Respiratory ROS: no dyspnea  Cardiovascular ROS: no exertional chest pain  Gastrointestinal ROS: NO abdominal discomfort,  NO rectal bleeding  Musculoskeletal ROS: no muscular pain  Neurological ROS: no recent stroke    Physical Exam:  /60   Pulse 96   Temp 98.2 °F (36.8 °C)   Resp 15   Ht 5' 3" (1.6 m)   Wt 72.6 kg (160 lb)   LMP 09/21/2014   SpO2 97%   Breastfeeding No   BMI 28.34 kg/m²   General: no distress  Head: normocephalic  Mallampati Score   Neck: supple, symmetrical, trachea midline  Lungs:  clear to auscultation bilaterally and normal respiratory effort  Heart: regular rate and rhythm  Abdomen: soft, non-tender non-distented; bowel sounds normal; no masses,  no organomegaly  Extremities: no cyanosis or edema, or clubbing    ASA:  II    PLAN  COLONOSCOPY.    SedationPlan :MAC    The details of the procedure, the possible need for biopsy or polypectomy and the " potential risks including bleeding, perforation, missed polyps were discussed in detail.

## 2024-09-23 NOTE — [employer]OVATION PATIENT INSTRUCTIONS
Discharge Summary/Instructions after an Endoscopic Procedure  Patient Name: Leela Hanna  Patient MRN: [id number]  Patient YOB: 1970 Monday, September 23, 2024  Kacey Westfall MD  Dear patient,  As a result of recent federal legislation (The Federal Cures Act), you may   receive lab or pathology results from your procedure in your [facility]   account before your physician is able to contact you. Your physician or   their representative will relay the results to you with their   recommendations at their soonest availability.  Thank you,  RESTRICTIONS:  During your procedure today, you received medications for sedation.  These   medications may affect your judgment, balance and coordination.  Therefore,   for 24 hours, you have the following restrictions:   - DO NOT drive a car, operate machinery, make legal/financial decisions,   sign important papers or drink alcohol.    ACTIVITY:  Today: no heavy lifting, straining or running due to procedural   sedation/anesthesia.  The following day: return to full activity including work.  DIET:  Eat and drink normally unless instructed otherwise.     TREATMENT FOR COMMON SIDE EFFECTS:  - Mild abdominal pain, nausea, belching, bloating or excessive gas:  rest,   eat lightly and use a heating pad.  - Sore Throat: treat with throat lozenges and/or gargle with warm salt   water.  - Because air was used during the procedure, expelling large amounts of air   from your rectum or belching is normal.  - If a bowel prep was taken, you may not have a bowel movement for 1-3 days.    This is normal.  SYMPTOMS TO WATCH FOR AND REPORT TO YOUR PHYSICIAN:  1. Abdominal pain or bloating, other than gas cramps.  2. Chest pain.  3. Back pain.  4. Signs of infection such as: chills or fever occurring within 24 hours   after the procedure.  5. Rectal bleeding, which would show as bright red, maroon, or black stools.   (A tablespoon of blood from the rectum is not serious,  especially if   hemorrhoids are present.)  6. Vomiting.  7. Weakness or dizziness.  GO DIRECTLY TO THE NEAREST EMERGENCY ROOM IF YOU HAVE ANY OF THE FOLLOWING:      Difficulty breathing              Chills and/or fever over 101 F   Persistent vomiting and/or vomiting blood   Severe abdominal pain   Severe chest pain   Black, tarry stools   Bleeding- more than one tablespoon   Any other symptom or condition that you feel may need urgent attention  Your doctor recommends these additional instructions:  If any biopsies were taken, your doctors clinic will contact you in 1 to 2   weeks with any results.  - Discharge patient to home.   - Resume previous diet.   - Continue present medications.   - Await pathology results.   - Repeat colonoscopy date to be determined after pending pathology results   are reviewed for surveillance.   - Written discharge instructions were provided to the patient.   - The signs and symptoms of potential delayed complications were discussed   with the patient.   - Patient has a contact number available for emergencies.   - Return to normal activities tomorrow.  For questions, problems or results please call your physician - Kacey Westfall MD at Work:  [phone].  [facility], EMERGENCY ROOM PHONE NUMBER: [phone]  IF A COMPLICATION OR EMERGENCY SITUATION ARISES AND YOU ARE UNABLE TO REACH   YOUR PHYSICIAN - GO DIRECTLY TO THE EMERGENCY ROOM.  Kacey Westfall MD  9/23/2024 11:02:23 AM  This report has been verified and signed electronically.  Dear patient,  As a result of recent federal legislation (The Federal Cures Act), you may   receive lab or pathology results from your procedure in your [facility]   account before your physician is able to contact you. Your physician or   their representative will relay the results to you with their   recommendations at their soonest availability.  Thank you,  PROVATION

## 2024-09-23 NOTE — ANESTHESIA PREPROCEDURE EVALUATION
09/23/2024  Leela Hanna is a 53 y.o., female.      Pre-op Assessment    I have reviewed the Patient Summary Reports.     I have reviewed the Nursing Notes. I have reviewed the NPO Status.   I have reviewed the Medications.     Review of Systems  Anesthesia Hx:  No problems with previous Anesthesia   History of prior surgery of interest to airway management or planning:          Denies Family Hx of Anesthesia complications.    Denies Personal Hx of Anesthesia complications.                    Hematology/Oncology:    Oncology Normal    -- Anemia:                                  EENT/Dental:  EENT/Dental Normal           Cardiovascular:  Exercise tolerance: good              hyperlipidemia                             Pulmonary:  Pulmonary Normal                       Renal/:  Renal/ Normal                 Hepatic/GI:  Hepatic/GI Normal                 Musculoskeletal:  Musculoskeletal Normal                Neurological:    Neuromuscular Disease,  Headaches                                 Endocrine:  Endocrine Normal            Dermatological:  Skin Normal    Psych:  Psychiatric Normal                    Physical Exam  General: Well nourished, Cooperative, Alert and Oriented    Airway:  Mallampati: II   Mouth Opening: Normal  TM Distance: Normal  Tongue: Normal  Neck ROM: Normal ROM    Dental:  Intact        Anesthesia Plan  Type of Anesthesia, risks & benefits discussed:    Anesthesia Type: Gen Natural Airway  Intra-op Monitoring Plan: Standard ASA Monitors  Induction:  IV  Informed Consent: Informed consent signed with the Patient and all parties understand the risks and agree with anesthesia plan.  All questions answered.   ASA Score: 2  Day of Surgery Review of History & Physical: H&P Update referred to the surgeon/provider.    Ready For Surgery From Anesthesia Perspective.     .

## 2024-09-24 ENCOUNTER — PATIENT MESSAGE (OUTPATIENT)
Dept: OTOLARYNGOLOGY | Facility: CLINIC | Age: 54
End: 2024-09-24
Payer: COMMERCIAL

## 2024-09-24 ENCOUNTER — PATIENT MESSAGE (OUTPATIENT)
Dept: SURGERY | Facility: CLINIC | Age: 54
End: 2024-09-24
Payer: COMMERCIAL

## 2024-09-24 DIAGNOSIS — R49.0 HOARSE VOICE QUALITY: Primary | ICD-10-CM

## 2024-09-24 RX ORDER — ESOMEPRAZOLE MAGNESIUM 40 MG/1
40 CAPSULE, DELAYED RELEASE ORAL
Qty: 14 CAPSULE | Refills: 0 | Status: SHIPPED | OUTPATIENT
Start: 2024-09-24 | End: 2024-10-08

## 2024-09-25 ENCOUNTER — PATIENT MESSAGE (OUTPATIENT)
Dept: SURGERY | Facility: CLINIC | Age: 54
End: 2024-09-25
Payer: COMMERCIAL

## 2024-09-25 LAB
FINAL PATHOLOGIC DIAGNOSIS: NORMAL
GROSS: NORMAL
Lab: NORMAL

## 2025-04-08 ENCOUNTER — PATIENT MESSAGE (OUTPATIENT)
Dept: OTOLARYNGOLOGY | Facility: CLINIC | Age: 55
End: 2025-04-08
Payer: COMMERCIAL

## 2025-07-13 DIAGNOSIS — E78.5 HYPERLIPIDEMIA, UNSPECIFIED HYPERLIPIDEMIA TYPE: ICD-10-CM

## 2025-07-13 NOTE — TELEPHONE ENCOUNTER
Care Due:                  Date            Visit Type   Department     Provider  --------------------------------------------------------------------------------                                MYCHART                              ANNUAL                              CHECKUP/PHY  [facility] INTERNAL  Last Visit: 12-      [facility]       Fina Fry  Next Visit: None Scheduled  None         None Found                                                            Last  Test          Frequency    Reason                     Performed    Due Date  --------------------------------------------------------------------------------    Office Visit  15 months..  rosuvastatin.............  12- 03-    CMP.........  12 months..  rosuvastatin.............  Not Found    Overdue    Lipid Panel.  12 months..  rosuvastatin.............  01- 01-    [facility] Embedded Care Due Messages. Reference number: [id number].   7/13/2025 3:23:53 AM CDT

## 2025-07-14 RX ORDER — ROSUVASTATIN CALCIUM 5 MG/1
5 TABLET, COATED ORAL
Qty: 30 TABLET | Refills: 1 | Status: SHIPPED | OUTPATIENT
Start: 2025-07-14

## 2025-07-14 NOTE — TELEPHONE ENCOUNTER
Refill Encounter    PCP Visits: Recent Visits  No visits were found meeting these conditions.  Showing recent visits within past 360 days and meeting all other requirements  Future Appointments  No visits were found meeting these conditions.  Showing future appointments within next 720 days and meeting all other requirements      Last 3 Blood Pressure:   BP Readings from Last 3 Encounters:   09/23/24 114/68   12/13/23 104/68   05/02/17 107/70     Preferred Pharmacy:   [id number] [place] [address] [employer] [address] [place] [address] [employer] [address]  Phone: [phone] Fax: [phone]      Requested RX:  Requested Prescriptions     Pending Prescriptions Disp Refills    rosuvastatin (CRESTOR) 5 MG tablet [Pharmacy Med Name: ROSUVASTATIN 5MG TABLETS] 90 tablet 0     Sig: TAKE 1 TABLET(5 MG) BY MOUTH EVERY DAY      RX Route: Normal